# Patient Record
Sex: FEMALE | NOT HISPANIC OR LATINO | URBAN - METROPOLITAN AREA
[De-identification: names, ages, dates, MRNs, and addresses within clinical notes are randomized per-mention and may not be internally consistent; named-entity substitution may affect disease eponyms.]

---

## 2017-05-16 ENCOUNTER — APPOINTMENT (OUTPATIENT)
Age: 62
Setting detail: DERMATOLOGY
End: 2017-05-16

## 2017-05-16 DIAGNOSIS — Z41.9 ENCOUNTER FOR PROCEDURE FOR PURPOSES OTHER THAN REMEDYING HEALTH STATE, UNSPECIFIED: ICD-10-CM

## 2017-05-16 PROCEDURE — OTHER BOTOX (U OR CC) ADDITIVE: OTHER

## 2017-05-16 PROCEDURE — OTHER BOTOX (U OR CC): OTHER

## 2017-05-16 ASSESSMENT — LOCATION SIMPLE DESCRIPTION DERM
LOCATION SIMPLE: RIGHT FOREHEAD
LOCATION SIMPLE: GLABELLA
LOCATION SIMPLE: LEFT FOREHEAD

## 2017-05-16 ASSESSMENT — LOCATION DETAILED DESCRIPTION DERM
LOCATION DETAILED: LEFT INFERIOR MEDIAL FOREHEAD
LOCATION DETAILED: GLABELLA
LOCATION DETAILED: RIGHT INFERIOR MEDIAL FOREHEAD
LOCATION DETAILED: LEFT INFERIOR FOREHEAD
LOCATION DETAILED: RIGHT INFERIOR FOREHEAD

## 2017-05-16 ASSESSMENT — LOCATION ZONE DERM: LOCATION ZONE: FACE

## 2017-05-16 NOTE — HPI: COSMETIC (BOTOX)
Have You Had Botox Before?: has had botox
Additional History: Patient had 22 units in her glabella total at her last 2 office visits and she liked that.  She did not like her forehead Botox as she has had an eyelid lift and she felt she had eyelid wrinkles from it so she doesn't want her forehead treated.
When Was Your Last Botox Treatment?: 12/15/16

## 2018-07-24 ENCOUNTER — APPOINTMENT (OUTPATIENT)
Age: 63
Setting detail: DERMATOLOGY
End: 2018-07-24

## 2018-07-24 DIAGNOSIS — Z41.9 ENCOUNTER FOR PROCEDURE FOR PURPOSES OTHER THAN REMEDYING HEALTH STATE, UNSPECIFIED: ICD-10-CM

## 2018-07-24 PROCEDURE — OTHER JUVEDERM ULTRA XC INJECTION: OTHER

## 2018-07-24 PROCEDURE — OTHER BOTOX (U OR CC): OTHER

## 2018-07-24 PROCEDURE — OTHER COSMETIC CONSULTATION: FILLERS: OTHER

## 2018-07-24 PROCEDURE — OTHER BOTOX (U OR CC) ADDITIVE: OTHER

## 2018-07-24 ASSESSMENT — LOCATION DETAILED DESCRIPTION DERM
LOCATION DETAILED: RIGHT INFERIOR MEDIAL FOREHEAD
LOCATION DETAILED: LEFT INFERIOR MEDIAL FOREHEAD
LOCATION DETAILED: RIGHT MEDIAL BUCCAL CHEEK
LOCATION DETAILED: LEFT MEDIAL BUCCAL CHEEK
LOCATION DETAILED: GLABELLA
LOCATION DETAILED: RIGHT MEDIAL EYEBROW

## 2018-07-24 ASSESSMENT — LOCATION SIMPLE DESCRIPTION DERM
LOCATION SIMPLE: LEFT FOREHEAD
LOCATION SIMPLE: LEFT CHEEK
LOCATION SIMPLE: RIGHT CHEEK
LOCATION SIMPLE: RIGHT FOREHEAD
LOCATION SIMPLE: RIGHT EYEBROW
LOCATION SIMPLE: GLABELLA

## 2018-07-24 ASSESSMENT — LOCATION ZONE DERM: LOCATION ZONE: FACE

## 2018-08-08 ENCOUNTER — APPOINTMENT (OUTPATIENT)
Age: 63
Setting detail: DERMATOLOGY
End: 2018-08-08

## 2018-08-08 DIAGNOSIS — Z41.9 ENCOUNTER FOR PROCEDURE FOR PURPOSES OTHER THAN REMEDYING HEALTH STATE, UNSPECIFIED: ICD-10-CM

## 2018-08-08 PROCEDURE — OTHER COSMETIC FOLLOW-UP: OTHER

## 2018-08-08 NOTE — PROCEDURE: COSMETIC FOLLOW-UP
Global Improvement: Marked
Comments (Free Text): \\n
Side Effects Or Complications: None
Detail Level: Detailed
Patient Satisfaction: Very pleased
Treatment (Optional): Botox

## 2018-12-12 ENCOUNTER — APPOINTMENT (OUTPATIENT)
Age: 63
Setting detail: DERMATOLOGY
End: 2018-12-12

## 2018-12-12 DIAGNOSIS — Z41.9 ENCOUNTER FOR PROCEDURE FOR PURPOSES OTHER THAN REMEDYING HEALTH STATE, UNSPECIFIED: ICD-10-CM

## 2018-12-12 PROCEDURE — OTHER BOTOX (U OR CC): OTHER

## 2018-12-12 PROCEDURE — OTHER BOTOX (U OR CC) ADDITIVE: OTHER

## 2018-12-12 ASSESSMENT — LOCATION DETAILED DESCRIPTION DERM
LOCATION DETAILED: LEFT MID TEMPLE
LOCATION DETAILED: LEFT SUPERIOR LATERAL MALAR CHEEK
LOCATION DETAILED: GLABELLA
LOCATION DETAILED: RIGHT INFERIOR FOREHEAD
LOCATION DETAILED: LEFT INFERIOR TEMPLE
LOCATION DETAILED: RIGHT MEDIAL EYEBROW
LOCATION DETAILED: LEFT INFERIOR MEDIAL FOREHEAD
LOCATION DETAILED: RIGHT MID TEMPLE
LOCATION DETAILED: RIGHT SUPERIOR LATERAL MALAR CHEEK
LOCATION DETAILED: RIGHT INFERIOR TEMPLE

## 2018-12-12 ASSESSMENT — LOCATION SIMPLE DESCRIPTION DERM
LOCATION SIMPLE: RIGHT CHEEK
LOCATION SIMPLE: RIGHT TEMPLE
LOCATION SIMPLE: LEFT FOREHEAD
LOCATION SIMPLE: RIGHT FOREHEAD
LOCATION SIMPLE: GLABELLA
LOCATION SIMPLE: LEFT CHEEK
LOCATION SIMPLE: LEFT TEMPLE
LOCATION SIMPLE: RIGHT EYEBROW

## 2018-12-12 ASSESSMENT — LOCATION ZONE DERM: LOCATION ZONE: FACE

## 2018-12-12 NOTE — HPI: COSMETIC (BOTOX)
Have You Had Botox Before?: has had botox
Additional History: Pt would like to discuss possible botox for crows feet.

## 2019-02-07 ENCOUNTER — APPOINTMENT (OUTPATIENT)
Age: 64
Setting detail: DERMATOLOGY
End: 2019-02-08

## 2019-02-07 DIAGNOSIS — L82.1 OTHER SEBORRHEIC KERATOSIS: ICD-10-CM

## 2019-02-07 PROCEDURE — 99213 OFFICE O/P EST LOW 20 MIN: CPT

## 2019-02-07 PROCEDURE — OTHER COUNSELING: OTHER

## 2019-02-07 PROCEDURE — OTHER REASSURANCE: OTHER

## 2019-02-07 ASSESSMENT — LOCATION SIMPLE DESCRIPTION DERM: LOCATION SIMPLE: SCALP

## 2019-02-07 ASSESSMENT — LOCATION DETAILED DESCRIPTION DERM: LOCATION DETAILED: LEFT CENTRAL FRONTAL SCALP

## 2019-02-07 ASSESSMENT — LOCATION ZONE DERM: LOCATION ZONE: SCALP

## 2019-02-07 NOTE — HPI: SKIN LESION
What Type Of Note Output Would You Prefer (Optional)?: Standard Output
Has Your Skin Lesion Been Treated?: not been treated
Is This A New Presentation, Or A Follow-Up?: Skin Lesion
Additional History: Patient states that she had hair colored on Tuesday and the spot developed. Patient states that every time she dyes her hair this spot occurs.
Which Family Member (Optional)?: Mom

## 2019-12-11 ENCOUNTER — APPOINTMENT (OUTPATIENT)
Dept: URBAN - METROPOLITAN AREA CLINIC 285 | Age: 64
Setting detail: DERMATOLOGY
End: 2019-12-11

## 2019-12-11 DIAGNOSIS — Z41.9 ENCOUNTER FOR PROCEDURE FOR PURPOSES OTHER THAN REMEDYING HEALTH STATE, UNSPECIFIED: ICD-10-CM

## 2019-12-11 PROCEDURE — OTHER BOTOX (U OR CC) ADDITIVE: OTHER

## 2019-12-11 PROCEDURE — OTHER BOTOX (U OR CC): OTHER

## 2019-12-11 PROCEDURE — OTHER FILLERS: OTHER

## 2019-12-11 ASSESSMENT — LOCATION ZONE DERM
LOCATION ZONE: LIP
LOCATION ZONE: FACE

## 2019-12-11 ASSESSMENT — LOCATION SIMPLE DESCRIPTION DERM
LOCATION SIMPLE: RIGHT CHEEK
LOCATION SIMPLE: LEFT FOREHEAD
LOCATION SIMPLE: LEFT CHEEK
LOCATION SIMPLE: RIGHT FOREHEAD
LOCATION SIMPLE: GLABELLA
LOCATION SIMPLE: RIGHT LIP
LOCATION SIMPLE: LEFT LIP

## 2019-12-11 ASSESSMENT — LOCATION DETAILED DESCRIPTION DERM
LOCATION DETAILED: LEFT LOWER CUTANEOUS LIP
LOCATION DETAILED: LEFT MEDIAL BUCCAL CHEEK
LOCATION DETAILED: LEFT INFERIOR MEDIAL MALAR CHEEK
LOCATION DETAILED: RIGHT INFERIOR MEDIAL MALAR CHEEK
LOCATION DETAILED: LEFT INFERIOR FOREHEAD
LOCATION DETAILED: LEFT ORAL COMMISSURE
LOCATION DETAILED: RIGHT LOWER CUTANEOUS LIP
LOCATION DETAILED: RIGHT ORAL COMMISSURE
LOCATION DETAILED: LEFT SUPERIOR MEDIAL BUCCAL CHEEK
LOCATION DETAILED: GLABELLA
LOCATION DETAILED: RIGHT MEDIAL BUCCAL CHEEK
LOCATION DETAILED: LEFT INFERIOR MEDIAL FOREHEAD
LOCATION DETAILED: RIGHT INFERIOR FOREHEAD
LOCATION DETAILED: RIGHT SUPERIOR MEDIAL BUCCAL CHEEK
LOCATION DETAILED: RIGHT INFERIOR MEDIAL FOREHEAD

## 2020-01-29 ENCOUNTER — APPOINTMENT (OUTPATIENT)
Dept: URBAN - METROPOLITAN AREA CLINIC 285 | Age: 65
Setting detail: DERMATOLOGY
End: 2020-01-29

## 2020-01-29 DIAGNOSIS — L81.4 OTHER MELANIN HYPERPIGMENTATION: ICD-10-CM

## 2020-01-29 PROCEDURE — OTHER PRESCRIPTION: OTHER

## 2020-01-29 PROCEDURE — 99213 OFFICE O/P EST LOW 20 MIN: CPT

## 2020-01-29 PROCEDURE — OTHER COUNSELING: OTHER

## 2020-01-29 RX ORDER — HYDROQUINONE 4 %
CREAM (GRAM) TOPICAL
Qty: 1 | Refills: 0 | Status: ERX | COMMUNITY
Start: 2020-01-29

## 2021-04-27 ENCOUNTER — APPOINTMENT (OUTPATIENT)
Dept: URBAN - METROPOLITAN AREA CLINIC 285 | Age: 66
Setting detail: DERMATOLOGY
End: 2021-04-27

## 2021-04-27 DIAGNOSIS — Z41.9 ENCOUNTER FOR PROCEDURE FOR PURPOSES OTHER THAN REMEDYING HEALTH STATE, UNSPECIFIED: ICD-10-CM

## 2021-04-27 PROCEDURE — OTHER COSMETIC CONSULTATION: FILLERS: OTHER

## 2021-04-27 PROCEDURE — OTHER BOTOX (U OR CC) ADDITIVE: OTHER

## 2021-04-27 PROCEDURE — OTHER ADDITIONAL NOTES: OTHER

## 2021-04-27 PROCEDURE — OTHER BOTOX (U OR CC): OTHER

## 2021-04-27 ASSESSMENT — LOCATION SIMPLE DESCRIPTION DERM
LOCATION SIMPLE: RIGHT FOREHEAD
LOCATION SIMPLE: LEFT TEMPLE
LOCATION SIMPLE: LEFT CHEEK
LOCATION SIMPLE: GLABELLA
LOCATION SIMPLE: RIGHT TEMPLE
LOCATION SIMPLE: RIGHT CHEEK
LOCATION SIMPLE: LEFT FOREHEAD

## 2021-04-27 ASSESSMENT — LOCATION DETAILED DESCRIPTION DERM
LOCATION DETAILED: RIGHT MID TEMPLE
LOCATION DETAILED: GLABELLA
LOCATION DETAILED: LEFT INFERIOR FOREHEAD
LOCATION DETAILED: LEFT INFERIOR MEDIAL FOREHEAD
LOCATION DETAILED: RIGHT SUPERIOR LATERAL MALAR CHEEK
LOCATION DETAILED: LEFT MID TEMPLE
LOCATION DETAILED: LEFT SUPERIOR LATERAL MALAR CHEEK
LOCATION DETAILED: RIGHT INFERIOR FOREHEAD
LOCATION DETAILED: RIGHT INFERIOR MEDIAL FOREHEAD

## 2021-04-27 ASSESSMENT — LOCATION ZONE DERM: LOCATION ZONE: FACE

## 2021-04-27 NOTE — PROCEDURE: ADDITIONAL NOTES
Render Risk Assessment In Note?: no
Detail Level: Simple
Additional Notes: Patient will not be receiving filler today due to recent COVID vaccine

## 2021-08-17 ENCOUNTER — APPOINTMENT (OUTPATIENT)
Dept: URBAN - METROPOLITAN AREA CLINIC 285 | Age: 66
Setting detail: DERMATOLOGY
End: 2021-08-17

## 2021-08-17 DIAGNOSIS — Z41.9 ENCOUNTER FOR PROCEDURE FOR PURPOSES OTHER THAN REMEDYING HEALTH STATE, UNSPECIFIED: ICD-10-CM

## 2021-08-17 PROCEDURE — OTHER BOTOX (U OR CC): OTHER

## 2021-08-17 PROCEDURE — OTHER BOTOX (U OR CC) ADDITIVE: OTHER

## 2021-08-17 ASSESSMENT — LOCATION DETAILED DESCRIPTION DERM
LOCATION DETAILED: LEFT INFERIOR MEDIAL FOREHEAD
LOCATION DETAILED: LEFT INFERIOR FOREHEAD
LOCATION DETAILED: RIGHT INFERIOR MEDIAL FOREHEAD
LOCATION DETAILED: RIGHT INFERIOR FOREHEAD
LOCATION DETAILED: GLABELLA

## 2021-08-17 ASSESSMENT — LOCATION SIMPLE DESCRIPTION DERM
LOCATION SIMPLE: GLABELLA
LOCATION SIMPLE: RIGHT FOREHEAD
LOCATION SIMPLE: LEFT FOREHEAD

## 2021-08-17 ASSESSMENT — LOCATION ZONE DERM: LOCATION ZONE: FACE

## 2022-04-26 ENCOUNTER — APPOINTMENT (OUTPATIENT)
Dept: URBAN - METROPOLITAN AREA CLINIC 285 | Age: 67
Setting detail: DERMATOLOGY
End: 2022-04-26

## 2022-04-26 DIAGNOSIS — L72.0 EPIDERMAL CYST: ICD-10-CM

## 2022-04-26 PROCEDURE — OTHER COUNSELING: OTHER

## 2022-04-26 PROCEDURE — OTHER ADDITIONAL NOTES: OTHER

## 2022-04-26 PROCEDURE — 99212 OFFICE O/P EST SF 10 MIN: CPT

## 2022-04-26 ASSESSMENT — LOCATION ZONE DERM: LOCATION ZONE: FACE

## 2022-04-26 ASSESSMENT — LOCATION DETAILED DESCRIPTION DERM: LOCATION DETAILED: LEFT CENTRAL MANDIBULAR CHEEK

## 2022-04-26 ASSESSMENT — LOCATION SIMPLE DESCRIPTION DERM: LOCATION SIMPLE: LEFT CHEEK

## 2022-04-26 NOTE — HPI: SKIN LESIONS
How Severe Is Your Skin Lesion?: mild
Have Your Skin Lesions Been Treated?: not been treated
Is This A New Presentation, Or A Follow-Up?: Skin Lesions
Additional History: Pt has had it for 25 years and was able to drain it in the past

## 2022-05-05 ENCOUNTER — APPOINTMENT (OUTPATIENT)
Dept: URBAN - METROPOLITAN AREA CLINIC 285 | Age: 67
Setting detail: DERMATOLOGY
End: 2022-05-06

## 2022-05-05 DIAGNOSIS — Z41.9 ENCOUNTER FOR PROCEDURE FOR PURPOSES OTHER THAN REMEDYING HEALTH STATE, UNSPECIFIED: ICD-10-CM

## 2022-05-05 PROCEDURE — OTHER COSMETIC CONSULTATION: FILLERS: OTHER

## 2022-05-05 PROCEDURE — OTHER JUVEDERM ULTRA PLUS XC INJECTION: OTHER

## 2022-11-02 ENCOUNTER — APPOINTMENT (OUTPATIENT)
Dept: URBAN - METROPOLITAN AREA CLINIC 285 | Age: 67
Setting detail: DERMATOLOGY
End: 2022-11-02

## 2022-11-02 DIAGNOSIS — Z41.9 ENCOUNTER FOR PROCEDURE FOR PURPOSES OTHER THAN REMEDYING HEALTH STATE, UNSPECIFIED: ICD-10-CM

## 2022-11-02 PROCEDURE — OTHER COSMETIC CONSULTATION: FILLERS: OTHER

## 2022-11-02 PROCEDURE — OTHER JUVEDERM ULTRA XC INJECTION: OTHER

## 2022-11-02 PROCEDURE — OTHER BOTOX (U OR CC) ADDITIVE: OTHER

## 2022-11-02 PROCEDURE — OTHER ADDITIONAL NOTES: OTHER

## 2022-11-02 PROCEDURE — OTHER BOTOX (U OR CC): OTHER

## 2022-11-02 PROCEDURE — OTHER COSMETIC CONSULTATION: BOTOX: OTHER

## 2022-11-02 ASSESSMENT — LOCATION DETAILED DESCRIPTION DERM
LOCATION DETAILED: RIGHT LOWER CUTANEOUS LIP
LOCATION DETAILED: RIGHT INFERIOR CENTRAL BUCCAL CHEEK
LOCATION DETAILED: RIGHT INFERIOR MEDIAL MALAR CHEEK
LOCATION DETAILED: RIGHT SUPERIOR MEDIAL BUCCAL CHEEK
LOCATION DETAILED: LEFT INFERIOR MEDIAL MALAR CHEEK
LOCATION DETAILED: LEFT SUPERIOR MEDIAL BUCCAL CHEEK
LOCATION DETAILED: LEFT INFERIOR CENTRAL BUCCAL CHEEK
LOCATION DETAILED: LEFT UPPER CUTANEOUS LIP
LOCATION DETAILED: RIGHT MEDIAL BUCCAL CHEEK
LOCATION DETAILED: LEFT MEDIAL BUCCAL CHEEK

## 2022-11-02 ASSESSMENT — LOCATION SIMPLE DESCRIPTION DERM
LOCATION SIMPLE: LEFT LIP
LOCATION SIMPLE: RIGHT LIP
LOCATION SIMPLE: RIGHT CHEEK
LOCATION SIMPLE: LEFT CHEEK

## 2022-11-02 ASSESSMENT — LOCATION ZONE DERM
LOCATION ZONE: LIP
LOCATION ZONE: FACE

## 2022-11-02 NOTE — PROCEDURE: ADDITIONAL NOTES
Additional Notes: The patient reports being mostly concerned about her marionette lines as her nasolabial folds don’t bother her as much. She reports that she was not happy with her treatment after last time as she felt I did not improve the lines at all. I reviewed that the filler (ultra and ultra plus) is approved to last up to one year but realistically the filler will only last about four to nine months due to the area being very dynamic. I reviewed potential side effects in detail and showed the patient photos on [employer] of what a vascular occlusion could look like. The patient was told to expect lumps and bumps and that those will take anywhere from 2-4 weeks to resolve. I reviewed with the patient that I cannot guarantee results. I had the patient hold the hand held mirror throughout treatment to reassess as I injected. She reported being happy with her treatment after I was finished. Additional Notes: The patient reports being mostly concerned about her marionette lines as her nasolabial folds don’t bother her as much. She reports that she was not happy with her treatment after last time as she felt I did not improve the lines at all. I reviewed that the filler (ultra and ultra plus) is approved to last up to one year but realistically the filler will only last about four to nine months due to the area being very dynamic. I reviewed potential side effects in detail and showed the patient photos on [employer] of what a vascular occlusion could look like. The patient was told to expect lumps and bumps and that those will take anywhere from 2-4 weeks to resolve. I reviewed with the patient that I cannot guarantee results. I had the patient hold the hand held mirror throughout treatment to reassess as I injected. She reported being happy with her treatment after I was finished.

## 2022-11-02 NOTE — PROCEDURE: JUVEDERM ULTRA XC INJECTION
Price (Use Numbers Only, No Special Characters Or $): 848 Price (Use Numbers Only, No Special Characters Or $): 267

## 2022-11-02 NOTE — PROCEDURE: JUVEDERM ULTRA XC INJECTION
Procedural Text: The treatment area and surrounding area was cleansed with Hibiclens and alcohol prior to the procedure. I washed my hands prior to putting gloves on and used aseptic techniques to keep the patient as clean as possible while injecting. I aspirated prior to injecting any filler. No back-flow of blood was observed while injecting. No blanching was present during our treatment session. The filler was administered to the treatment areas noted above. A capillary refill test was performed on the nasolabial area, marionette area, chin, nose, and lips (superior and inferior). The capillary refill time in each area was under two seconds. The patient had no concerns or questions after we completed our treatment. Patient instructed to apply ice every hour for 10-15 minutes at a time to help swelling and discomfort.

## 2023-06-27 NOTE — PROCEDURE: ADDITIONAL NOTES
Render Risk Assessment In Note?: no
Detail Level: Simple
Additional Notes: Recommended pt see Dr. Rehman if she decides that she wants it removed.
Metronidazole Counseling:  I discussed with the patient the risks of metronidazole including but not limited to seizures, nausea/vomiting, a metallic taste in the mouth, nausea/vomiting and severe allergy.

## 2023-08-08 ENCOUNTER — APPOINTMENT (OUTPATIENT)
Dept: URBAN - METROPOLITAN AREA CLINIC 235 | Age: 68
Setting detail: DERMATOLOGY
End: 2023-08-08

## 2023-08-08 DIAGNOSIS — Z41.9 ENCOUNTER FOR PROCEDURE FOR PURPOSES OTHER THAN REMEDYING HEALTH STATE, UNSPECIFIED: ICD-10-CM

## 2023-08-08 PROCEDURE — OTHER BOTOX (U OR CC): OTHER

## 2023-08-08 PROCEDURE — OTHER BOTOX (U OR CC) ADDITIVE: OTHER

## 2023-08-08 ASSESSMENT — LOCATION DETAILED DESCRIPTION DERM
LOCATION DETAILED: LEFT MEDIAL TEMPLE
LOCATION DETAILED: RIGHT CENTRAL ZYGOMA
LOCATION DETAILED: RIGHT CENTRAL TEMPLE
LOCATION DETAILED: LEFT CENTRAL ZYGOMA
LOCATION DETAILED: RIGHT SUPERIOR CENTRAL MALAR CHEEK

## 2023-08-08 ASSESSMENT — LOCATION SIMPLE DESCRIPTION DERM
LOCATION SIMPLE: LEFT ZYGOMA
LOCATION SIMPLE: LEFT TEMPLE
LOCATION SIMPLE: RIGHT TEMPLE
LOCATION SIMPLE: RIGHT ZYGOMA
LOCATION SIMPLE: RIGHT CHEEK

## 2023-08-08 ASSESSMENT — LOCATION ZONE DERM: LOCATION ZONE: FACE

## 2024-05-07 ENCOUNTER — APPOINTMENT (OUTPATIENT)
Dept: URBAN - METROPOLITAN AREA CLINIC 235 | Age: 69
Setting detail: DERMATOLOGY
End: 2024-05-07

## 2024-05-07 DIAGNOSIS — Z41.9 ENCOUNTER FOR PROCEDURE FOR PURPOSES OTHER THAN REMEDYING HEALTH STATE, UNSPECIFIED: ICD-10-CM

## 2024-05-07 PROCEDURE — OTHER JUVEDERM ULTRA XC INJECTION: OTHER

## 2024-05-07 PROCEDURE — OTHER DIAGNOSIS COMMENT: OTHER

## 2024-05-07 ASSESSMENT — LOCATION DETAILED DESCRIPTION DERM
LOCATION DETAILED: RIGHT LOWER CUTANEOUS LIP
LOCATION DETAILED: LEFT LOWER CUTANEOUS LIP
LOCATION DETAILED: LEFT MEDIAL BUCCAL CHEEK
LOCATION DETAILED: RIGHT CHIN
LOCATION DETAILED: LEFT CENTRAL BUCCAL CHEEK
LOCATION DETAILED: RIGHT MEDIAL BUCCAL CHEEK
LOCATION DETAILED: RIGHT INFERIOR CENTRAL BUCCAL CHEEK

## 2024-05-07 ASSESSMENT — LOCATION ZONE DERM
LOCATION ZONE: LIP
LOCATION ZONE: FACE

## 2024-05-07 ASSESSMENT — LOCATION SIMPLE DESCRIPTION DERM
LOCATION SIMPLE: RIGHT CHEEK
LOCATION SIMPLE: LEFT LIP
LOCATION SIMPLE: RIGHT LIP
LOCATION SIMPLE: LEFT CHEEK
LOCATION SIMPLE: CHIN

## 2024-05-07 NOTE — PROCEDURE: JUVEDERM ULTRA XC INJECTION
Mid Face Filler Volume In Cc: 0
Anesthesia Type: 0.3% lidocaine (mixed within filler)
Show Inventory Tab: Show
Filler: Juvederm Ultra XC
Additional Area 4 Volume In Cc: 0.5
Use Map Statement For Sites (Optional): No
Expiration Date (Month Year): 07/15/2023
Additional Area 1 Location: oral commissure
Additional Area 3 Location: chin
Procedural Text: The treatment area and surrounding area was cleansed with Hibiclens and alcohol prior to the procedure. I washed my hands prior to putting gloves on and used aseptic techniques to keep the patient as clean as possible while injecting. I aspirated prior to injecting any filler. No back-flow of blood was observed while injecting. No blanching was present during our treatment session. The filler was administered to the treatment areas noted above. A capillary refill test was performed on the marionette lines, chin, and jawline. The capillary refill time in each area was under three seconds. The patient had no concerns or questions after we completed our treatment. Patient instructed to apply ice every hour for 10-15 minutes at a time to help swelling and discomfort. The patient tolerated treatment well and left without complications. The patient assessed the treatment areas post injection and reported being very pleased with the results. I did recommend microneedling to assist with the static lines resent. \\n\\nLot#: [id number]\\nExp: 2024-10-12
Additional Anesthesia Volume In Cc: 2
Consent: Written consent obtained. Risks include but not limited to bruising, beading, irregular texture, ulceration, infection, allergic reaction, scar formation, incomplete augmentation, temporary nature, procedural pain.
Marionette Lines Filler Volume In Cc: 1.5
Detail Level: Detailed
Lot #: [id number]
Post-Care Instructions: Patient instructed to apply ice to reduce swelling.
Topical Anesthesia?: BLT cream (benzocaine 20%, lidocaine 10%, tetracaine 10%)
Additional Area 2 Location: pyriform aperature
Additional Area 4 Location: mental crease
Map Statment: See Attach Map for Complete Details
Additional Anesthesia Type: 0.1% lidocaine with 1:1,000,000 epinephrine (tumescent anesthesia)

## 2024-05-07 NOTE — PROCEDURE: DIAGNOSIS COMMENT
Render Risk Assessment In Note?: no
Detail Level: Simple
Comment: I reviewed with the patient that results are not guaranteed nor is the duration the filler will last. I explained that this filler clinically can last up to one year but typically only lasts about 4-9 months in this treatment area. The patient reports understanding.

## 2024-05-14 ENCOUNTER — APPOINTMENT (OUTPATIENT)
Dept: URBAN - METROPOLITAN AREA CLINIC 235 | Age: 69
Setting detail: DERMATOLOGY
End: 2024-05-14

## 2024-05-14 DIAGNOSIS — Z41.9 ENCOUNTER FOR PROCEDURE FOR PURPOSES OTHER THAN REMEDYING HEALTH STATE, UNSPECIFIED: ICD-10-CM

## 2024-05-14 PROCEDURE — OTHER JUVEDERM VOLBELLA INJECTION: OTHER

## 2024-05-14 ASSESSMENT — LOCATION ZONE DERM
LOCATION ZONE: LIP
LOCATION ZONE: FACE

## 2024-05-14 ASSESSMENT — LOCATION DETAILED DESCRIPTION DERM
LOCATION DETAILED: RIGHT MEDIAL BUCCAL CHEEK
LOCATION DETAILED: LEFT SUPERIOR VERMILION LIP
LOCATION DETAILED: RIGHT SUPERIOR VERMILION LIP
LOCATION DETAILED: RIGHT UPPER CUTANEOUS LIP
LOCATION DETAILED: LEFT UPPER CUTANEOUS LIP
LOCATION DETAILED: RIGHT INFERIOR VERMILION LIP
LOCATION DETAILED: LEFT INFERIOR VERMILION LIP
LOCATION DETAILED: RIGHT LOWER CUTANEOUS LIP
LOCATION DETAILED: LEFT MEDIAL BUCCAL CHEEK
LOCATION DETAILED: RIGHT SUPERIOR VERMILION BORDER
LOCATION DETAILED: LEFT LOWER CUTANEOUS LIP

## 2024-05-14 ASSESSMENT — LOCATION SIMPLE DESCRIPTION DERM
LOCATION SIMPLE: RIGHT UPPER LIP
LOCATION SIMPLE: LEFT LIP
LOCATION SIMPLE: RIGHT CHEEK
LOCATION SIMPLE: RIGHT LIP
LOCATION SIMPLE: LEFT CHEEK

## 2024-05-14 NOTE — PROCEDURE: JUVEDERM VOLBELLA INJECTION
Anesthesia Volume In Cc: 0.5
Topical Anesthesia?: BLT cream (benzocaine 20%, lidocaine 10%, tetracaine 10%)
Show Inventory Tab: Show
Additional Area 1 Volume In Cc: 0.6
Number Of Syringes (Required For Inventory): 1
Include Cannula Information In Note?: No
Additional Area 3 Volume In Cc: 0
Vermilion Lips Filler Volume In Cc: 0.4
Consent: Written consent obtained. Risks include but not limited to bruising, beading, irregular texture, ulceration, infection, allergic reaction, scar formation, incomplete augmentation, temporary nature, procedural pain.
Expiration Date (Month Year): 06/17/2024
Additional Area 2 Location: oral commissure
Detail Level: Detailed
Lot #: [id number]
Post-Care Instructions: Patient instructed to apply ice to reduce swelling.
Filler: Juvederm Volbella XC
Additional Area 1 Location: perioral lines
Procedural Text: The treatment area and surrounding area was cleansed with Hibiclens and alcohol prior to the procedure. I washed my hands prior to putting gloves on and used aseptic techniques to keep the patient as clean as possible while injecting. I aspirated prior to injecting any filler. No back-flow of blood was observed while injecting. No blanching was present during our treatment session. The filler was administered to the treatment areas noted above. A capillary refill test was performed on superior and inferior vermillion body and boarder. The capillary refill time in each area was under three seconds. The patient had no concerns or questions after we completed our treatment. Patient instructed to apply ice every hour for 10-15 minutes at a time to help swelling and discomfort.\\n\\nLot#: [id number]\\nExp: 01/07/2025
Additional Area 3 Location: lateral cheek lines
Map Statment: See Attach Map for Complete Details
Anesthesia Type: 0.3% lidocaine (mixed within filler)

## 2024-10-18 ENCOUNTER — APPOINTMENT (OUTPATIENT)
Dept: URBAN - METROPOLITAN AREA CLINIC 235 | Age: 69
Setting detail: DERMATOLOGY
End: 2024-10-21

## 2024-10-18 DIAGNOSIS — Z71.89 OTHER SPECIFIED COUNSELING: ICD-10-CM

## 2024-10-18 DIAGNOSIS — L81.8 OTHER SPECIFIED DISORDERS OF PIGMENTATION: ICD-10-CM

## 2024-10-18 DIAGNOSIS — D22 MELANOCYTIC NEVI: ICD-10-CM

## 2024-10-18 DIAGNOSIS — Z80.8 FAMILY HISTORY OF MALIGNANT NEOPLASM OF OTHER ORGANS OR SYSTEMS: ICD-10-CM

## 2024-10-18 DIAGNOSIS — D18.0 HEMANGIOMA: ICD-10-CM

## 2024-10-18 DIAGNOSIS — L81.4 OTHER MELANIN HYPERPIGMENTATION: ICD-10-CM

## 2024-10-18 DIAGNOSIS — L82.1 OTHER SEBORRHEIC KERATOSIS: ICD-10-CM

## 2024-10-18 PROBLEM — D22.39 MELANOCYTIC NEVI OF OTHER PARTS OF FACE: Status: ACTIVE | Noted: 2024-10-18

## 2024-10-18 PROBLEM — D18.01 HEMANGIOMA OF SKIN AND SUBCUTANEOUS TISSUE: Status: ACTIVE | Noted: 2024-10-18

## 2024-10-18 PROBLEM — D22.61 MELANOCYTIC NEVI OF RIGHT UPPER LIMB, INCLUDING SHOULDER: Status: ACTIVE | Noted: 2024-10-18

## 2024-10-18 PROBLEM — D22.5 MELANOCYTIC NEVI OF TRUNK: Status: ACTIVE | Noted: 2024-10-18

## 2024-10-18 PROBLEM — D22.72 MELANOCYTIC NEVI OF LEFT LOWER LIMB, INCLUDING HIP: Status: ACTIVE | Noted: 2024-10-18

## 2024-10-18 PROBLEM — D22.71 MELANOCYTIC NEVI OF RIGHT LOWER LIMB, INCLUDING HIP: Status: ACTIVE | Noted: 2024-10-18

## 2024-10-18 PROBLEM — D22.62 MELANOCYTIC NEVI OF LEFT UPPER LIMB, INCLUDING SHOULDER: Status: ACTIVE | Noted: 2024-10-18

## 2024-10-18 PROCEDURE — OTHER COUNSELING: OTHER

## 2024-10-18 PROCEDURE — 99213 OFFICE O/P EST LOW 20 MIN: CPT

## 2024-10-18 PROCEDURE — OTHER MIPS QUALITY: OTHER

## 2024-10-18 PROCEDURE — OTHER SUNSCREEN RECOMMENDATIONS: OTHER

## 2024-10-18 ASSESSMENT — LOCATION DETAILED DESCRIPTION DERM
LOCATION DETAILED: PERIUMBILICAL SKIN
LOCATION DETAILED: RIGHT INFERIOR MEDIAL FOREHEAD
LOCATION DETAILED: RIGHT ANTERIOR DISTAL UPPER ARM
LOCATION DETAILED: INFERIOR MID FOREHEAD
LOCATION DETAILED: RIGHT VENTRAL PROXIMAL FOREARM
LOCATION DETAILED: RIGHT ANTERIOR DISTAL THIGH
LOCATION DETAILED: LEFT ANTERIOR DISTAL THIGH
LOCATION DETAILED: LEFT VENTRAL PROXIMAL FOREARM
LOCATION DETAILED: RIGHT ANTERIOR PROXIMAL THIGH
LOCATION DETAILED: LEFT INFERIOR LATERAL UPPER BACK
LOCATION DETAILED: LOWER STERNUM
LOCATION DETAILED: EPIGASTRIC SKIN
LOCATION DETAILED: LEFT INFERIOR MEDIAL FOREHEAD
LOCATION DETAILED: LEFT ANTERIOR PROXIMAL THIGH
LOCATION DETAILED: LEFT ANTERIOR DISTAL UPPER ARM
LOCATION DETAILED: LEFT INFERIOR LATERAL MIDBACK

## 2024-10-18 ASSESSMENT — LOCATION SIMPLE DESCRIPTION DERM
LOCATION SIMPLE: LEFT FOREHEAD
LOCATION SIMPLE: LEFT UPPER ARM
LOCATION SIMPLE: RIGHT FOREARM
LOCATION SIMPLE: RIGHT UPPER ARM
LOCATION SIMPLE: LEFT FOREARM
LOCATION SIMPLE: CHEST
LOCATION SIMPLE: LEFT THIGH
LOCATION SIMPLE: RIGHT FOREHEAD
LOCATION SIMPLE: LEFT UPPER BACK
LOCATION SIMPLE: LEFT LOWER BACK
LOCATION SIMPLE: ABDOMEN
LOCATION SIMPLE: RIGHT THIGH
LOCATION SIMPLE: INFERIOR FOREHEAD

## 2024-10-18 ASSESSMENT — LOCATION ZONE DERM
LOCATION ZONE: FACE
LOCATION ZONE: LEG
LOCATION ZONE: TRUNK
LOCATION ZONE: ARM

## 2025-02-11 ENCOUNTER — APPOINTMENT (OUTPATIENT)
Dept: URBAN - METROPOLITAN AREA CLINIC 235 | Age: 70
Setting detail: DERMATOLOGY
End: 2025-02-17

## 2025-02-11 DIAGNOSIS — Z41.9 ENCOUNTER FOR PROCEDURE FOR PURPOSES OTHER THAN REMEDYING HEALTH STATE, UNSPECIFIED: ICD-10-CM

## 2025-02-11 PROCEDURE — OTHER VI PEEL: OTHER

## 2025-02-11 ASSESSMENT — LOCATION DETAILED DESCRIPTION DERM: LOCATION DETAILED: LEFT CENTRAL MALAR CHEEK

## 2025-02-11 ASSESSMENT — LOCATION SIMPLE DESCRIPTION DERM: LOCATION SIMPLE: LEFT CHEEK

## 2025-02-11 ASSESSMENT — LOCATION ZONE DERM: LOCATION ZONE: FACE

## 2025-02-11 NOTE — PROCEDURE: VI PEEL
Consent: Prior to the procedure, written consent was obtained and risks were reviewed, including but not limited to: redness, peeling, blistering, pigmentary change, scarring, infection, and pain. Patient is aware multiple treatments may be necessary to achieve the desired outcome.
Chemical Peel: VI Peel
Treatment Number: 0
Prep: The treated area was degreased with pre-peel cleanser, and vaseline was applied for protection of mucous membranes.
Post Peel Care: After the procedure, the patient was instructed not to wash the treated area for 6-8 hours or manually remove dead skin when the peeling process starts. Patient may use OTC hydrocortisone cream for itching.  Patient instructed to use the provided Retin-A wipes on the treated area on the 1st and 2nd nights.
Detail Level: Zone

## 2025-03-13 ENCOUNTER — APPOINTMENT (OUTPATIENT)
Dept: URBAN - METROPOLITAN AREA CLINIC 235 | Age: 70
Setting detail: DERMATOLOGY
End: 2025-03-14

## 2025-03-13 DIAGNOSIS — Z41.9 ENCOUNTER FOR PROCEDURE FOR PURPOSES OTHER THAN REMEDYING HEALTH STATE, UNSPECIFIED: ICD-10-CM

## 2025-03-13 PROCEDURE — OTHER EVO ALEXANDRITE 755NM: OTHER

## 2025-03-13 ASSESSMENT — LOCATION DETAILED DESCRIPTION DERM: LOCATION DETAILED: LEFT LATERAL MALAR CHEEK

## 2025-03-13 ASSESSMENT — LOCATION SIMPLE DESCRIPTION DERM: LOCATION SIMPLE: LEFT CHEEK

## 2025-03-13 ASSESSMENT — LOCATION ZONE DERM: LOCATION ZONE: FACE

## 2025-03-13 NOTE — PROCEDURE: EVO ALEXANDRITE 755NM
Anesthesia Type: 1% lidocaine with epinephrine
Detail Level: Simple
Fluence (J/Cm2): 0.5
Procedure Note: Treatment was administered using the setting parameters listed above.
Post-Care Instructions: I reviewed with the patient in detail post-care instructions. Patient should avoid sun exposure before and after treatment.  Diligent sunscreen use and sun avoidance advised.
Rep Rate (Hz): 1
Add Another Setting?: no
Treatment Number: 0
Cooling: Dany 12C
Spot Size: 5mm
Consent: Written consent obtained.  Risks reviewed including but not limited to erythema, swelling, crusting, scabbing, blistering, scarring, and darker or lighter pigmentary change.  Patient understands that results are not guaranteed and multiple treatments may be needed to achieve desired result.
Pulse Duration (Ms): 19

## 2025-05-06 ENCOUNTER — APPOINTMENT (OUTPATIENT)
Dept: URBAN - METROPOLITAN AREA CLINIC 235 | Age: 70
Setting detail: DERMATOLOGY
End: 2025-05-06

## 2025-05-06 DIAGNOSIS — Z41.9 ENCOUNTER FOR PROCEDURE FOR PURPOSES OTHER THAN REMEDYING HEALTH STATE, UNSPECIFIED: ICD-10-CM

## 2025-05-06 PROCEDURE — OTHER EVO ALEXANDRITE 755NM: OTHER

## 2025-05-06 PROCEDURE — OTHER JUVEDERM ULTRA PLUS XC INJECTION: OTHER

## 2025-05-06 ASSESSMENT — LOCATION DETAILED DESCRIPTION DERM
LOCATION DETAILED: RIGHT MEDIAL BUCCAL CHEEK
LOCATION DETAILED: RIGHT LOWER CUTANEOUS LIP
LOCATION DETAILED: LEFT CENTRAL BUCCAL CHEEK
LOCATION DETAILED: LEFT INFERIOR CENTRAL MALAR CHEEK
LOCATION DETAILED: LEFT MEDIAL BUCCAL CHEEK
LOCATION DETAILED: LEFT ORAL COMMISSURE
LOCATION DETAILED: LEFT INFERIOR CENTRAL BUCCAL CHEEK
LOCATION DETAILED: RIGHT CHIN
LOCATION DETAILED: LEFT LOWER CUTANEOUS LIP

## 2025-05-06 ASSESSMENT — LOCATION SIMPLE DESCRIPTION DERM
LOCATION SIMPLE: RIGHT LIP
LOCATION SIMPLE: LEFT CHEEK
LOCATION SIMPLE: LEFT CHEEK
LOCATION SIMPLE: CHIN
LOCATION SIMPLE: LEFT LIP
LOCATION SIMPLE: RIGHT CHEEK

## 2025-05-06 ASSESSMENT — LOCATION ZONE DERM
LOCATION ZONE: FACE
LOCATION ZONE: FACE
LOCATION ZONE: LIP

## 2025-05-15 ENCOUNTER — APPOINTMENT (OUTPATIENT)
Dept: URBAN - METROPOLITAN AREA CLINIC 235 | Age: 70
Setting detail: DERMATOLOGY
End: 2025-05-15

## 2025-05-15 DIAGNOSIS — Z41.9 ENCOUNTER FOR PROCEDURE FOR PURPOSES OTHER THAN REMEDYING HEALTH STATE, UNSPECIFIED: ICD-10-CM

## 2025-05-15 PROCEDURE — OTHER JUVEDERM VOLBELLA INJECTION: OTHER

## 2025-05-15 ASSESSMENT — LOCATION DETAILED DESCRIPTION DERM
LOCATION DETAILED: RIGHT SUPERIOR VERMILION LIP
LOCATION DETAILED: RIGHT NASOLABIAL FOLD
LOCATION DETAILED: LEFT INFERIOR VERMILION LIP
LOCATION DETAILED: LEFT UPPER CUTANEOUS LIP
LOCATION DETAILED: PHILTRUM
LOCATION DETAILED: RIGHT LOWER CUTANEOUS LIP
LOCATION DETAILED: RIGHT UPPER CUTANEOUS LIP
LOCATION DETAILED: RIGHT INFERIOR VERMILION LIP
LOCATION DETAILED: LEFT SUPERIOR VERMILION LIP
LOCATION DETAILED: LEFT SUPERIOR VERMILION BORDER
LOCATION DETAILED: RIGHT SUPERIOR VERMILION BORDER
LOCATION DETAILED: LEFT LOWER CUTANEOUS LIP

## 2025-05-15 ASSESSMENT — LOCATION SIMPLE DESCRIPTION DERM
LOCATION SIMPLE: LEFT UPPER LIP
LOCATION SIMPLE: LEFT LIP
LOCATION SIMPLE: RIGHT UPPER LIP
LOCATION SIMPLE: RIGHT LIP
LOCATION SIMPLE: UPPER LIP

## 2025-05-15 ASSESSMENT — LOCATION ZONE DERM: LOCATION ZONE: LIP
